# Patient Record
Sex: FEMALE | Race: OTHER | HISPANIC OR LATINO
[De-identification: names, ages, dates, MRNs, and addresses within clinical notes are randomized per-mention and may not be internally consistent; named-entity substitution may affect disease eponyms.]

---

## 2020-04-18 ENCOUNTER — APPOINTMENT (OUTPATIENT)
Dept: DISASTER EMERGENCY | Facility: CLINIC | Age: 50
End: 2020-04-18
Payer: COMMERCIAL

## 2020-04-18 VITALS
SYSTOLIC BLOOD PRESSURE: 124 MMHG | HEART RATE: 84 BPM | RESPIRATION RATE: 16 BRPM | OXYGEN SATURATION: 97 % | DIASTOLIC BLOOD PRESSURE: 80 MMHG | TEMPERATURE: 98.6 F

## 2020-04-18 VITALS — BODY MASS INDEX: 24.63 KG/M2 | WEIGHT: 139 LBS | HEIGHT: 63 IN

## 2020-04-18 DIAGNOSIS — R68.89 OTHER GENERAL SYMPTOMS AND SIGNS: ICD-10-CM

## 2020-04-18 PROBLEM — Z00.00 ENCOUNTER FOR PREVENTIVE HEALTH EXAMINATION: Status: ACTIVE | Noted: 2020-04-18

## 2020-04-18 PROCEDURE — 99203 OFFICE O/P NEW LOW 30 MIN: CPT

## 2020-04-18 NOTE — HISTORY OF PRESENT ILLNESS
[Patient presents to the office today for COVID-19 evaluation and testing.] : Patient presents to the office today for COVID-19 evaluation and testing. [Patient has been pre-screened by RN at call center for appointment today with our facility.] : Patient has been pre-screened by RN at call center for appointment today with our facility. [] : no dizziness on standing [None Known] : none known [None] : none [Clear] : clear [Speaks in full sentences] : speaks in full sentences [Normal O2 sat at rest] : normal O2 sat at rest [Grossly normal, interacts, not tired or weak] : grossly normal, interacts, not tired or weak [COVID-19 testing ordered and specimen obtained] : COVID-19 testing ordered and specimen obtained [Discharged with current Quarantine instructions and advised of signs of worsening illness.] : Patient discharged with current quarantine instructions and advised of signs of worsening illness. Patient told to seek emergent care if symptoms occur. [FreeTextEntry1] : Laurel is a 49 year old female who presents for COVID 19 testing. She reports chills, night sweats,  fatigue, SOB, dry cough, muscle aches, and diarrhea x 2 days. She lives with her mother, , and disabled 20 year old son. She works as a  but has not worked in the last month. She denies sick contacts.  Past medical history of malignant breast cancer 14 years ago.  [[id number]] : \par Patient education provided for COVID-19. Explained if symptoms such as SOB or fever progress, patient instructed to go to ED.  Discussed isolation precautions and handwashing techniques. Social distancing reviewed. Utilize Tylenol for fever >101. No signs of cardiovascular decompensation. Patient verbalized understanding of provided instructions. Tests results may take up to 3-7 days to result. Discussed possibility of false negative results. Instructed to self quarantine and must remain quarantined x 14 days and no fever for three days without antipyretic medication.  All patient close contacts should also self quarantine. Social distancing once quarantine is completed. If patient has symptoms of chest discomfort, severe SOB at rest, worsening shortness of breath with minimal exertion, new or worsening wheezing, dizziness were instructed to seek immediate medical evaluation. \par \par COVID-19 nasal swab preformed and ordered in office today. Advised test results may take 3-7 days to return. Discussed diagnostic accuracy and possibility of false negative results. Instructed to self quarantine and must remain quarantined x 14 days and no fever for 3 days without antifever medications.  All patients close contacts should also self quarantine.  Close contacts with comorbidities at higher risk of COVID disease.  Social distancing once quarantine is completed.  If high risk symptoms of chest discomfort, severe shortness of breath at rest, worsening shortness of breath with minimal exertion, new or worsening wheezing, dizziness then instructed to seek immediate medical evaluation.  A letter for work and patient education form was provided.  The above plan was reviewed with the patient.  All questions have been answered.  Instructed to call if any new symptoms\par  \par \par \par

## 2020-04-19 LAB — SARS-COV-2 N GENE NPH QL NAA+PROBE: NOT DETECTED

## 2021-05-17 ENCOUNTER — NON-APPOINTMENT (OUTPATIENT)
Age: 51
End: 2021-05-17

## 2021-05-20 ENCOUNTER — NON-APPOINTMENT (OUTPATIENT)
Age: 51
End: 2021-05-20

## 2021-05-21 ENCOUNTER — NON-APPOINTMENT (OUTPATIENT)
Age: 51
End: 2021-05-21

## 2021-05-24 ENCOUNTER — APPOINTMENT (OUTPATIENT)
Dept: COLORECTAL SURGERY | Facility: CLINIC | Age: 51
End: 2021-05-24
Payer: COMMERCIAL

## 2021-05-24 VITALS
TEMPERATURE: 97.4 F | BODY MASS INDEX: 26.58 KG/M2 | HEIGHT: 63 IN | HEART RATE: 70 BPM | WEIGHT: 150 LBS | SYSTOLIC BLOOD PRESSURE: 110 MMHG | DIASTOLIC BLOOD PRESSURE: 71 MMHG

## 2021-05-24 DIAGNOSIS — Z85.3 PERSONAL HISTORY OF MALIGNANT NEOPLASM OF BREAST: ICD-10-CM

## 2021-05-24 PROCEDURE — 99072 ADDL SUPL MATRL&STAF TM PHE: CPT

## 2021-05-24 PROCEDURE — 99204 OFFICE O/P NEW MOD 45 MIN: CPT

## 2021-05-24 RX ORDER — AMOXICILLIN 500 MG/1
CAPSULE ORAL
Refills: 0 | Status: DISCONTINUED | COMMUNITY

## 2021-05-24 NOTE — HISTORY OF PRESENT ILLNESS
[FreeTextEntry1] : 51 yo F with recent admission to [facility] for perforated diverticulitis 5/12/21.\par \par Patient presented to hospital with abdominal pain; CT scan showed diverticulitis in proximal sigmoid associated with a few droplets of air. She was managed nonoperatively with antibiotics and discharged on 5/17/21 with Augmentin and on a liquid diet. Patient has been taking Augmentin, will take final doses tomorrow. Still on liquid or pureed diet, avoiding fiber. Tried fish but had nausea and cramping pain.\par \par BH: daily; but since this episode, now small and firmer bowel movements.\par \par Never had a colonoscopy. No family history of colorectal cancer or inflammatory bowel disease; mother with diverticulitis.\par \par No ASA or NSAIDs, tylenol as needed for pain but rarely.\par \par Had a surgery to remove gallstones when she was 16 yo; has her gallbladder. Unsure what procedure was done, surgery performed in [facility]. Has upper midline scar.

## 2021-05-24 NOTE — ASSESSMENT
[FreeTextEntry1] : Complicated diverticulitis with extraluminal air.\par \par Recommend additional course of antibiotic therapy for persistent discomfort. Advised patient overall of elective surgery versus nonoperative conservative management. I reviewed with the patient the risks, benefits alternatives of surgery versus nonoperative treatment. The risk of recurrent diverticulitis and secondary complications was outlined. The patient wishes to proceed with surgical treatment.\par \par She will complete a course of additional antibiotics. She will return in 4 weeks for repeat assessment and planning for surgical resection. Advised patient need of need for preoperative colonoscopy for surveillance of potential online cortical polyps and cancer. Anticipate surgery in 2 months

## 2021-05-24 NOTE — PHYSICAL EXAM
[Abdomen Masses] : No abdominal masses [Abdomen Tenderness] : ~T No ~M abdominal tenderness [No HSM] : no hepatosplenomegaly [JVD] : no jugular venous distention  [Normal Breath Sounds] : Normal breath sounds [Normal Heart Sounds] : normal heart sounds [de-identified] : Mild/moderate left lower quadrant tenderness. No rebound or guarding

## 2021-06-28 ENCOUNTER — APPOINTMENT (OUTPATIENT)
Dept: COLORECTAL SURGERY | Facility: CLINIC | Age: 51
End: 2021-06-28
Payer: COMMERCIAL

## 2021-06-28 VITALS
TEMPERATURE: 98.2 F | DIASTOLIC BLOOD PRESSURE: 76 MMHG | WEIGHT: 151 LBS | BODY MASS INDEX: 26.75 KG/M2 | SYSTOLIC BLOOD PRESSURE: 113 MMHG | HEART RATE: 76 BPM | HEIGHT: 63 IN

## 2021-06-28 DIAGNOSIS — Z83.3 FAMILY HISTORY OF DIABETES MELLITUS: ICD-10-CM

## 2021-06-28 DIAGNOSIS — Z80.3 FAMILY HISTORY OF MALIGNANT NEOPLASM OF BREAST: ICD-10-CM

## 2021-06-28 DIAGNOSIS — Z84.89 FAMILY HISTORY OF OTHER SPECIFIED CONDITIONS: ICD-10-CM

## 2021-06-28 DIAGNOSIS — Z80.1 FAMILY HISTORY OF MALIGNANT NEOPLASM OF TRACHEA, BRONCHUS AND LUNG: ICD-10-CM

## 2021-06-28 DIAGNOSIS — Z80.6 FAMILY HISTORY OF LEUKEMIA: ICD-10-CM

## 2021-06-28 DIAGNOSIS — Z83.511 FAMILY HISTORY OF GLAUCOMA: ICD-10-CM

## 2021-06-28 DIAGNOSIS — Z72.89 OTHER PROBLEMS RELATED TO LIFESTYLE: ICD-10-CM

## 2021-06-28 DIAGNOSIS — Z87.891 PERSONAL HISTORY OF NICOTINE DEPENDENCE: ICD-10-CM

## 2021-06-28 PROCEDURE — 99072 ADDL SUPL MATRL&STAF TM PHE: CPT

## 2021-06-28 PROCEDURE — 99215 OFFICE O/P EST HI 40 MIN: CPT

## 2021-06-28 RX ORDER — AMOXICILLIN AND CLAVULANATE POTASSIUM 875; 125 MG/1; MG/1
875-125 TABLET, COATED ORAL
Qty: 14 | Refills: 0 | Status: DISCONTINUED | COMMUNITY
Start: 2021-05-24 | End: 2021-06-28

## 2021-06-28 NOTE — ASSESSMENT
[FreeTextEntry1] : I had extensive discussion with the patient (45 minutes) regarding the diagnosis and treatment options. I recommended that she consider proceeding with a robotic sigmoid colectomy.\par .\par The associated risks, benefits, alternatives of the procedure have been outlined discussed and reviewed with the patient's family. These risks including but not limited to bleeding, infection, anastomotic leak, need for secondary surgery, need for ileostomy or colostomy creation, change in bowel habits, DVt, PE , as well as the risk of heart and lung complications infection and death were detailed. The patient understands these risks and consents the planned procedure. Appropriate  literature regarding surgery and post operative treatment/complications and enhanced recovery pathway has been detailed and reviewed. Consent was obtained. All questions were answered.\par

## 2021-06-28 NOTE — HISTORY OF PRESENT ILLNESS
[FreeTextEntry1] : 50 y/o F presents for f/u diverticulitis \par PSH of cholecystectomy \par \par Patient hospitalized at [facility] 5/12/21 for perforated diverticulitis, d/c'd home on 5/17/21 with a course of Augmentin\par \par Last seen in the office on 5/24/21. Mild to moderate left lower quadrant tenderness noted on exam, given an additional 7 day course of Augmentin. Completed with improvement in symptoms\par \par C/o GERD. Also c/o abdominal pain and bloating 10-15 minutes after eating, especially when consuming fiber. Currently following a low fiber bland diet\par Reports one episode of vomiting several days ago associated with reflux\par C/o difficulty passing gas\par Denies fever or chills\par BH: Daily\par Admits to straining\par No use of stool softeners, fiber supplements or laxatives \par Never had a colonoscopy

## 2021-07-08 ENCOUNTER — NON-APPOINTMENT (OUTPATIENT)
Age: 51
End: 2021-07-08

## 2021-07-08 ENCOUNTER — APPOINTMENT (OUTPATIENT)
Dept: GASTROENTEROLOGY | Facility: CLINIC | Age: 51
End: 2021-07-08
Payer: COMMERCIAL

## 2021-07-08 VITALS
SYSTOLIC BLOOD PRESSURE: 102 MMHG | BODY MASS INDEX: 26.58 KG/M2 | WEIGHT: 150 LBS | DIASTOLIC BLOOD PRESSURE: 62 MMHG | HEIGHT: 63 IN | HEART RATE: 72 BPM | TEMPERATURE: 98.2 F

## 2021-07-08 DIAGNOSIS — K57.80 DIVERTICULITIS OF INTESTINE, PART UNSPECIFIED, WITH PERFORATION AND ABSCESS W/OUT BLEEDING: ICD-10-CM

## 2021-07-08 PROCEDURE — 99203 OFFICE O/P NEW LOW 30 MIN: CPT

## 2021-07-08 PROCEDURE — 99072 ADDL SUPL MATRL&STAF TM PHE: CPT

## 2021-07-08 RX ORDER — SIMETHICONE 125 MG
125 CAPSULE ORAL
Refills: 0 | Status: ACTIVE | COMMUNITY

## 2021-07-08 NOTE — ASSESSMENT
[FreeTextEntry1] : recent acute diverticulitis with micro perforation of the sigmoid colon\par \par 2.  patient is scheduled to have surgery with Dr Ben Gutierrez, [place] rectal surgeon at [facility], and patient is scheduled for approx july 28th\par \par 3.  to perform colonoscopy and egd next week, and it will be approx eight weeks since the diverticulitis onset\par \par 4.  will give a dose of augmentin 875 mg two hours before; although this is not usually my practise, i am a bit concerned by the microperforation history\par \par 5\par \par \par More than 50% of the face to face time was devoted to counseling and /or coordination of care.  THis coordination of care may have included reviewing other medical notes and reports, and communicating with other health professionals\par AS WE OBTAIN INFORMED CONSENT FOR COLONOSCOPY, UPPER ENDOSCOPY [EGD], OR BOTH PROCEDURES TOGETHER;\par \par As with all procedures, there are risks of which the patient should be aware\par \par 1.  Anesthesia; deep sedation with Propofol;  there is a small risk of aspiration and pulmonary infection.  The Anesthesiologist meets with the patient the morning of the procedure to discuss in more detail\par \par 2.  risk of bleeding; from removal of a polyp, or rarely, from biopsies, 1 % or less\par \par 3.  risk of injury or perforation of the colon or upper GI tract; one in a thousand or less,  from removing a polyp or from advancing the instrument\par \par \par schedule of augmentin\par augmentin 875 mg po at 8 am on july 14th, then 8pm that night, then two doses the next day\par \par will order ten days of augmentin just in case it is needed

## 2021-07-08 NOTE — HISTORY OF PRESENT ILLNESS
[FreeTextEntry1] : patient in mid May 2021 for acute diverticulitis with several air droplets contained around the sigmoid colon consistent with a microperforation of acutediverticulitis. She had peritoneal findings in the left lower quadrant, was seen I Dr. Anthony Mcclain  of surgery,and received Zosyn and then followed the course with Augmentin at home.\par \par The patient had a questionable history of penicillin allergy, but tolerated the treatment with zosyn and augmentin well without any reaction at all\par \par Patient has been more or less assymptomatic since discharge, but does experience some milld llq discomfort, transient, at times\par \par this was her first attack of diverticulitis\par \par She has not had any significant medical issues, and just takes allegra\par \par she has been experiencing frequent heartburn to virtually all foods recently, but has not been on any regular   therapy.\par no cardiovascular disease\par \par has not previously had a colonoscopy, and there is no fh of colon cancer

## 2021-07-13 ENCOUNTER — RESULT REVIEW (OUTPATIENT)
Age: 51
End: 2021-07-13

## 2021-07-14 ENCOUNTER — RESULT REVIEW (OUTPATIENT)
Age: 51
End: 2021-07-14

## 2021-07-14 ENCOUNTER — APPOINTMENT (OUTPATIENT)
Dept: GASTROENTEROLOGY | Facility: HOSPITAL | Age: 51
End: 2021-07-14

## 2021-07-27 ENCOUNTER — TRANSCRIPTION ENCOUNTER (OUTPATIENT)
Age: 51
End: 2021-07-27

## 2021-07-27 VITALS
OXYGEN SATURATION: 98 % | DIASTOLIC BLOOD PRESSURE: 76 MMHG | HEART RATE: 73 BPM | HEIGHT: 63 IN | WEIGHT: 151.9 LBS | SYSTOLIC BLOOD PRESSURE: 116 MMHG | RESPIRATION RATE: 18 BRPM | TEMPERATURE: 98 F

## 2021-07-28 ENCOUNTER — INPATIENT (INPATIENT)
Facility: HOSPITAL | Age: 51
LOS: 1 days | Discharge: HOME | DRG: 331 | End: 2021-07-30
Attending: SURGERY | Admitting: SURGERY
Payer: COMMERCIAL

## 2021-07-28 ENCOUNTER — APPOINTMENT (OUTPATIENT)
Dept: COLORECTAL SURGERY | Facility: HOSPITAL | Age: 51
End: 2021-07-28

## 2021-07-28 ENCOUNTER — RESULT REVIEW (OUTPATIENT)
Age: 51
End: 2021-07-28

## 2021-07-28 DIAGNOSIS — Z98.891 HISTORY OF UTERINE SCAR FROM PREVIOUS SURGERY: Chronic | ICD-10-CM

## 2021-07-28 DIAGNOSIS — Z90.49 ACQUIRED ABSENCE OF OTHER SPECIFIED PARTS OF DIGESTIVE TRACT: Chronic | ICD-10-CM

## 2021-07-28 LAB
BLD GP AB SCN SERPL QL: NEGATIVE — SIGNIFICANT CHANGE UP
RH IG SCN BLD-IMP: NEGATIVE — SIGNIFICANT CHANGE UP

## 2021-07-28 PROCEDURE — 45330 DIAGNOSTIC SIGMOIDOSCOPY: CPT | Mod: GC

## 2021-07-28 PROCEDURE — 88307 TISSUE EXAM BY PATHOLOGIST: CPT | Mod: 26

## 2021-07-28 PROCEDURE — 44213 LAP MOBIL SPLENIC FL ADD-ON: CPT | Mod: GC

## 2021-07-28 PROCEDURE — 88304 TISSUE EXAM BY PATHOLOGIST: CPT | Mod: 26

## 2021-07-28 PROCEDURE — 44207 L COLECTOMY/COLOPROCTOSTOMY: CPT | Mod: GC

## 2021-07-28 RX ORDER — ONDANSETRON 8 MG/1
4 TABLET, FILM COATED ORAL EVERY 6 HOURS
Refills: 0 | Status: DISCONTINUED | OUTPATIENT
Start: 2021-07-28 | End: 2021-07-30

## 2021-07-28 RX ORDER — ACETAMINOPHEN 500 MG
1000 TABLET ORAL ONCE
Refills: 0 | Status: COMPLETED | OUTPATIENT
Start: 2021-07-29 | End: 2021-07-29

## 2021-07-28 RX ORDER — SODIUM CHLORIDE 9 MG/ML
1000 INJECTION, SOLUTION INTRAVENOUS
Refills: 0 | Status: DISCONTINUED | OUTPATIENT
Start: 2021-07-28 | End: 2021-07-30

## 2021-07-28 RX ORDER — HYDROMORPHONE HYDROCHLORIDE 2 MG/ML
0.5 INJECTION INTRAMUSCULAR; INTRAVENOUS; SUBCUTANEOUS EVERY 4 HOURS
Refills: 0 | Status: DISCONTINUED | OUTPATIENT
Start: 2021-07-28 | End: 2021-07-30

## 2021-07-28 RX ORDER — HEPARIN SODIUM 5000 [USP'U]/ML
5000 INJECTION INTRAVENOUS; SUBCUTANEOUS ONCE
Refills: 0 | Status: COMPLETED | OUTPATIENT
Start: 2021-07-28 | End: 2021-07-28

## 2021-07-28 RX ORDER — ACETAMINOPHEN 500 MG
1000 TABLET ORAL EVERY 6 HOURS
Refills: 0 | Status: DISCONTINUED | OUTPATIENT
Start: 2021-07-28 | End: 2021-07-28

## 2021-07-28 RX ORDER — HEPARIN SODIUM 5000 [USP'U]/ML
5000 INJECTION INTRAVENOUS; SUBCUTANEOUS EVERY 8 HOURS
Refills: 0 | Status: DISCONTINUED | OUTPATIENT
Start: 2021-07-28 | End: 2021-07-30

## 2021-07-28 RX ORDER — GABAPENTIN 400 MG/1
600 CAPSULE ORAL ONCE
Refills: 0 | Status: COMPLETED | OUTPATIENT
Start: 2021-07-28 | End: 2021-07-28

## 2021-07-28 RX ORDER — KETOROLAC TROMETHAMINE 30 MG/ML
15 SYRINGE (ML) INJECTION EVERY 6 HOURS
Refills: 0 | Status: DISCONTINUED | OUTPATIENT
Start: 2021-07-28 | End: 2021-07-30

## 2021-07-28 RX ORDER — BUPIVACAINE 13.3 MG/ML
20 INJECTION, SUSPENSION, LIPOSOMAL INFILTRATION ONCE
Refills: 0 | Status: DISCONTINUED | OUTPATIENT
Start: 2021-07-28 | End: 2021-07-28

## 2021-07-28 RX ORDER — FEXOFENADINE HCL 30 MG
1 TABLET ORAL
Qty: 0 | Refills: 0 | DISCHARGE

## 2021-07-28 RX ORDER — ACETAMINOPHEN 500 MG
1000 TABLET ORAL ONCE
Refills: 0 | Status: COMPLETED | OUTPATIENT
Start: 2021-07-28 | End: 2021-07-28

## 2021-07-28 RX ADMIN — Medication 1000 MILLIGRAM(S): at 14:34

## 2021-07-28 RX ADMIN — Medication 15 MILLIGRAM(S): at 23:17

## 2021-07-28 RX ADMIN — Medication 15 MILLIGRAM(S): at 18:25

## 2021-07-28 RX ADMIN — HEPARIN SODIUM 5000 UNIT(S): 5000 INJECTION INTRAVENOUS; SUBCUTANEOUS at 07:41

## 2021-07-28 RX ADMIN — Medication 1000 MILLIGRAM(S): at 13:09

## 2021-07-28 RX ADMIN — GABAPENTIN 600 MILLIGRAM(S): 400 CAPSULE ORAL at 07:41

## 2021-07-28 RX ADMIN — HEPARIN SODIUM 5000 UNIT(S): 5000 INJECTION INTRAVENOUS; SUBCUTANEOUS at 18:25

## 2021-07-28 RX ADMIN — Medication 1000 MILLIGRAM(S): at 07:41

## 2021-07-28 RX ADMIN — Medication 1000 MILLIGRAM(S): at 18:26

## 2021-07-28 RX ADMIN — Medication 1000 MILLIGRAM(S): at 07:48

## 2021-07-28 RX ADMIN — Medication 15 MILLIGRAM(S): at 23:32

## 2021-07-28 RX ADMIN — HYDROMORPHONE HYDROCHLORIDE 0.5 MILLIGRAM(S): 2 INJECTION INTRAMUSCULAR; INTRAVENOUS; SUBCUTANEOUS at 11:56

## 2021-07-28 RX ADMIN — HYDROMORPHONE HYDROCHLORIDE 0.5 MILLIGRAM(S): 2 INJECTION INTRAMUSCULAR; INTRAVENOUS; SUBCUTANEOUS at 12:21

## 2021-07-28 NOTE — BRIEF OPERATIVE NOTE - OPERATION/FINDINGS
Veress needle access. Optiview placement of 12mm port; rest of ports placed under direct visualization. TAP block. Intraoperative colonoscopy demonstrated no transverse or ascending colonic lesions. Right colon and hepatic flexure mobilized. High ligation of ileocolic and R branch of middle colic with Vessel sealer. Proximal and distal margins taken with robotic stapler blue load. Ileocolic anastomosis created with robotic stapler blue load x 1 fire. Common channel closed with Quill running suture and Lemberted. Hemostatic. 5 cm off midline incision created in RLQ and specimen removed. Specimen open and polyp confirmed in distal ascending colon. Closing tray utilized; gowns and gloves changed. Peritoneum closed with 2-0 Vicryl running; fascia closed with #1 PDS running. Closed skin with 3-0 Vicryl deep dermal and 4-0 Monocryl.

## 2021-07-28 NOTE — H&P ADULT - NSHPPHYSICALEXAM_GEN_ALL_CORE
Constitutional: WDWN NAD  Heart: s1 s2  Lungs: no use of accessory muscles  Abdomen: soft, nontender, nondistended, without masses, erythema, ecchymosis

## 2021-07-28 NOTE — H&P ADULT - ASSESSMENT
51 year old female with PMH of multiple bouts of diverticulitis presents for sigmoid colon resection    Plan:  To OR  ERAS protocol  Colon bundle  admission post op

## 2021-07-28 NOTE — H&P ADULT - HISTORY OF PRESENT ILLNESS
51 year old female with PMH of multiple bouts of diverticulitis presents for sigmoid colon resection. Patient last hospitalized at [facility] 5/12/21 for perforated diverticulitis; patient was discharged home 5 days later with a course of Augmentin. Currently patient has no complaints.

## 2021-07-28 NOTE — BRIEF OPERATIVE NOTE - NSICDXBRIEFPROCEDURE_GEN_ALL_CORE_FT
PROCEDURES:  Robot-assisted laparoscopic left colectomy or sigmoidectomy using da Jluis Xi 28-Jul-2021 12:22:32  Yane Go

## 2021-07-28 NOTE — PACU DISCHARGE NOTE - COMMENTS
Pt A&ox4, operative site clean, dry and intact with dermabond; pt reports pain controlled at present, tolerating clears. Plan of care endorsed to receiving rn Frantz, 8306.1

## 2021-07-29 LAB
ANION GAP SERPL CALC-SCNC: 11 MMOL/L — SIGNIFICANT CHANGE UP (ref 5–17)
BUN SERPL-MCNC: 10 MG/DL — SIGNIFICANT CHANGE UP (ref 7–23)
CALCIUM SERPL-MCNC: 9.7 MG/DL — SIGNIFICANT CHANGE UP (ref 8.4–10.5)
CHLORIDE SERPL-SCNC: 109 MMOL/L — HIGH (ref 96–108)
CO2 SERPL-SCNC: 23 MMOL/L — SIGNIFICANT CHANGE UP (ref 22–31)
COVID-19 SPIKE DOMAIN AB INTERP: POSITIVE
COVID-19 SPIKE DOMAIN ANTIBODY RESULT: >250 U/ML — HIGH
CREAT SERPL-MCNC: 0.76 MG/DL — SIGNIFICANT CHANGE UP (ref 0.5–1.3)
GLUCOSE SERPL-MCNC: 101 MG/DL — HIGH (ref 70–99)
HCT VFR BLD CALC: 35.1 % — SIGNIFICANT CHANGE UP (ref 34.5–45)
HGB BLD-MCNC: 11.5 G/DL — SIGNIFICANT CHANGE UP (ref 11.5–15.5)
MAGNESIUM SERPL-MCNC: 2 MG/DL — SIGNIFICANT CHANGE UP (ref 1.6–2.6)
MCHC RBC-ENTMCNC: 29.9 PG — SIGNIFICANT CHANGE UP (ref 27–34)
MCHC RBC-ENTMCNC: 32.8 GM/DL — SIGNIFICANT CHANGE UP (ref 32–36)
MCV RBC AUTO: 91.4 FL — SIGNIFICANT CHANGE UP (ref 80–100)
NRBC # BLD: 0 /100 WBCS — SIGNIFICANT CHANGE UP (ref 0–0)
PHOSPHATE SERPL-MCNC: 3 MG/DL — SIGNIFICANT CHANGE UP (ref 2.5–4.5)
PLATELET # BLD AUTO: 223 K/UL — SIGNIFICANT CHANGE UP (ref 150–400)
POTASSIUM SERPL-MCNC: 3.9 MMOL/L — SIGNIFICANT CHANGE UP (ref 3.5–5.3)
POTASSIUM SERPL-SCNC: 3.9 MMOL/L — SIGNIFICANT CHANGE UP (ref 3.5–5.3)
RBC # BLD: 3.84 M/UL — SIGNIFICANT CHANGE UP (ref 3.8–5.2)
RBC # FLD: 12.5 % — SIGNIFICANT CHANGE UP (ref 10.3–14.5)
SARS-COV-2 IGG+IGM SERPL QL IA: >250 U/ML — HIGH
SARS-COV-2 IGG+IGM SERPL QL IA: POSITIVE
SODIUM SERPL-SCNC: 143 MMOL/L — SIGNIFICANT CHANGE UP (ref 135–145)
WBC # BLD: 14.84 K/UL — HIGH (ref 3.8–10.5)
WBC # FLD AUTO: 14.84 K/UL — HIGH (ref 3.8–10.5)

## 2021-07-29 RX ADMIN — HYDROMORPHONE HYDROCHLORIDE 0.5 MILLIGRAM(S): 2 INJECTION INTRAMUSCULAR; INTRAVENOUS; SUBCUTANEOUS at 17:52

## 2021-07-29 RX ADMIN — Medication 15 MILLIGRAM(S): at 17:48

## 2021-07-29 RX ADMIN — HEPARIN SODIUM 5000 UNIT(S): 5000 INJECTION INTRAVENOUS; SUBCUTANEOUS at 11:30

## 2021-07-29 RX ADMIN — Medication 15 MILLIGRAM(S): at 11:33

## 2021-07-29 RX ADMIN — Medication 400 MILLIGRAM(S): at 00:37

## 2021-07-29 RX ADMIN — HEPARIN SODIUM 5000 UNIT(S): 5000 INJECTION INTRAVENOUS; SUBCUTANEOUS at 02:58

## 2021-07-29 RX ADMIN — Medication 1000 MILLIGRAM(S): at 00:52

## 2021-07-29 RX ADMIN — HYDROMORPHONE HYDROCHLORIDE 0.5 MILLIGRAM(S): 2 INJECTION INTRAMUSCULAR; INTRAVENOUS; SUBCUTANEOUS at 07:40

## 2021-07-29 RX ADMIN — Medication 400 MILLIGRAM(S): at 06:39

## 2021-07-29 RX ADMIN — HYDROMORPHONE HYDROCHLORIDE 0.5 MILLIGRAM(S): 2 INJECTION INTRAMUSCULAR; INTRAVENOUS; SUBCUTANEOUS at 17:48

## 2021-07-29 RX ADMIN — Medication 15 MILLIGRAM(S): at 23:15

## 2021-07-29 RX ADMIN — HYDROMORPHONE HYDROCHLORIDE 0.5 MILLIGRAM(S): 2 INJECTION INTRAMUSCULAR; INTRAVENOUS; SUBCUTANEOUS at 11:34

## 2021-07-29 RX ADMIN — HYDROMORPHONE HYDROCHLORIDE 0.5 MILLIGRAM(S): 2 INJECTION INTRAMUSCULAR; INTRAVENOUS; SUBCUTANEOUS at 11:30

## 2021-07-29 RX ADMIN — Medication 1000 MILLIGRAM(S): at 06:54

## 2021-07-29 RX ADMIN — Medication 15 MILLIGRAM(S): at 06:18

## 2021-07-29 RX ADMIN — Medication 15 MILLIGRAM(S): at 23:30

## 2021-07-29 RX ADMIN — HYDROMORPHONE HYDROCHLORIDE 0.5 MILLIGRAM(S): 2 INJECTION INTRAMUSCULAR; INTRAVENOUS; SUBCUTANEOUS at 07:25

## 2021-07-29 RX ADMIN — Medication 15 MILLIGRAM(S): at 06:03

## 2021-07-29 RX ADMIN — Medication 15 MILLIGRAM(S): at 11:30

## 2021-07-29 RX ADMIN — HEPARIN SODIUM 5000 UNIT(S): 5000 INJECTION INTRAVENOUS; SUBCUTANEOUS at 17:48

## 2021-07-29 RX ADMIN — Medication 15 MILLIGRAM(S): at 17:52

## 2021-07-29 NOTE — PROGRESS NOTE ADULT - ASSESSMENT
51 year old female with PMH of multiple bouts of diverticulitis presents for sigmoid colon resection. Patient last hospitalized at [facility] 5/12/21 for perforated diverticulitis; patient was discharged home 5 days later with a course of Augmentin. Presented for elective surgery, now s/p robot assisted sigmoidectomy on 7/28.    Pain/nausea control prn  CLD/IVF  HSQ/SCDs  D/c Weaver   AM labs

## 2021-07-30 ENCOUNTER — TRANSCRIPTION ENCOUNTER (OUTPATIENT)
Age: 51
End: 2021-07-30

## 2021-07-30 VITALS
HEART RATE: 87 BPM | OXYGEN SATURATION: 95 % | SYSTOLIC BLOOD PRESSURE: 144 MMHG | RESPIRATION RATE: 16 BRPM | TEMPERATURE: 99 F | DIASTOLIC BLOOD PRESSURE: 80 MMHG

## 2021-07-30 LAB
ANION GAP SERPL CALC-SCNC: 4 MMOL/L — LOW (ref 5–17)
BUN SERPL-MCNC: 7 MG/DL — SIGNIFICANT CHANGE UP (ref 7–23)
CALCIUM SERPL-MCNC: 9.2 MG/DL — SIGNIFICANT CHANGE UP (ref 8.4–10.5)
CHLORIDE SERPL-SCNC: 108 MMOL/L — SIGNIFICANT CHANGE UP (ref 96–108)
CO2 SERPL-SCNC: 29 MMOL/L — SIGNIFICANT CHANGE UP (ref 22–31)
CREAT SERPL-MCNC: 0.73 MG/DL — SIGNIFICANT CHANGE UP (ref 0.5–1.3)
GLUCOSE SERPL-MCNC: 94 MG/DL — SIGNIFICANT CHANGE UP (ref 70–99)
HCT VFR BLD CALC: 31.2 % — LOW (ref 34.5–45)
HGB BLD-MCNC: 9.9 G/DL — LOW (ref 11.5–15.5)
MAGNESIUM SERPL-MCNC: 1.6 MG/DL — SIGNIFICANT CHANGE UP (ref 1.6–2.6)
MCHC RBC-ENTMCNC: 29.7 PG — SIGNIFICANT CHANGE UP (ref 27–34)
MCHC RBC-ENTMCNC: 31.7 GM/DL — LOW (ref 32–36)
MCV RBC AUTO: 93.7 FL — SIGNIFICANT CHANGE UP (ref 80–100)
NRBC # BLD: 0 /100 WBCS — SIGNIFICANT CHANGE UP (ref 0–0)
PHOSPHATE SERPL-MCNC: 3.1 MG/DL — SIGNIFICANT CHANGE UP (ref 2.5–4.5)
PLATELET # BLD AUTO: 176 K/UL — SIGNIFICANT CHANGE UP (ref 150–400)
POTASSIUM SERPL-MCNC: 4 MMOL/L — SIGNIFICANT CHANGE UP (ref 3.5–5.3)
POTASSIUM SERPL-SCNC: 4 MMOL/L — SIGNIFICANT CHANGE UP (ref 3.5–5.3)
RBC # BLD: 3.33 M/UL — LOW (ref 3.8–5.2)
RBC # FLD: 12.8 % — SIGNIFICANT CHANGE UP (ref 10.3–14.5)
SODIUM SERPL-SCNC: 141 MMOL/L — SIGNIFICANT CHANGE UP (ref 135–145)
WBC # BLD: 6.98 K/UL — SIGNIFICANT CHANGE UP (ref 3.8–10.5)
WBC # FLD AUTO: 6.98 K/UL — SIGNIFICANT CHANGE UP (ref 3.8–10.5)

## 2021-07-30 PROCEDURE — 84100 ASSAY OF PHOSPHORUS: CPT

## 2021-07-30 PROCEDURE — 82962 GLUCOSE BLOOD TEST: CPT

## 2021-07-30 PROCEDURE — 36415 COLL VENOUS BLD VENIPUNCTURE: CPT

## 2021-07-30 PROCEDURE — 86850 RBC ANTIBODY SCREEN: CPT

## 2021-07-30 PROCEDURE — C1889: CPT

## 2021-07-30 PROCEDURE — 83735 ASSAY OF MAGNESIUM: CPT

## 2021-07-30 PROCEDURE — 88304 TISSUE EXAM BY PATHOLOGIST: CPT

## 2021-07-30 PROCEDURE — 86900 BLOOD TYPING SEROLOGIC ABO: CPT

## 2021-07-30 PROCEDURE — 85027 COMPLETE CBC AUTOMATED: CPT

## 2021-07-30 PROCEDURE — S2900: CPT

## 2021-07-30 PROCEDURE — 86769 SARS-COV-2 COVID-19 ANTIBODY: CPT

## 2021-07-30 PROCEDURE — 88307 TISSUE EXAM BY PATHOLOGIST: CPT

## 2021-07-30 PROCEDURE — 80048 BASIC METABOLIC PNL TOTAL CA: CPT

## 2021-07-30 PROCEDURE — 86901 BLOOD TYPING SEROLOGIC RH(D): CPT

## 2021-07-30 RX ORDER — OXYCODONE AND ACETAMINOPHEN 5; 325 MG/1; MG/1
1 TABLET ORAL
Qty: 20 | Refills: 0
Start: 2021-07-30 | End: 2021-08-03

## 2021-07-30 RX ORDER — DOCUSATE SODIUM 100 MG
1 CAPSULE ORAL
Qty: 60 | Refills: 0
Start: 2021-07-30 | End: 2021-08-28

## 2021-07-30 RX ORDER — MAGNESIUM SULFATE 500 MG/ML
2 VIAL (ML) INJECTION EVERY 6 HOURS
Refills: 0 | Status: DISCONTINUED | OUTPATIENT
Start: 2021-07-30 | End: 2021-07-30

## 2021-07-30 RX ADMIN — HYDROMORPHONE HYDROCHLORIDE 0.5 MILLIGRAM(S): 2 INJECTION INTRAMUSCULAR; INTRAVENOUS; SUBCUTANEOUS at 02:22

## 2021-07-30 RX ADMIN — Medication 15 MILLIGRAM(S): at 07:12

## 2021-07-30 RX ADMIN — Medication 50 GRAM(S): at 12:05

## 2021-07-30 RX ADMIN — Medication 15 MILLIGRAM(S): at 12:20

## 2021-07-30 RX ADMIN — Medication 15 MILLIGRAM(S): at 12:05

## 2021-07-30 RX ADMIN — HYDROMORPHONE HYDROCHLORIDE 0.5 MILLIGRAM(S): 2 INJECTION INTRAMUSCULAR; INTRAVENOUS; SUBCUTANEOUS at 02:37

## 2021-07-30 RX ADMIN — Medication 15 MILLIGRAM(S): at 06:57

## 2021-07-30 RX ADMIN — HEPARIN SODIUM 5000 UNIT(S): 5000 INJECTION INTRAVENOUS; SUBCUTANEOUS at 12:06

## 2021-07-30 RX ADMIN — HEPARIN SODIUM 5000 UNIT(S): 5000 INJECTION INTRAVENOUS; SUBCUTANEOUS at 02:22

## 2021-07-30 NOTE — DISCHARGE NOTE PROVIDER - INSTRUCTIONS
Vegetables should be initially be cooked (baked, steamed, blanched, etc)  May want to start with peeled and/or canned fruit  Limit fat/oil intake and fried/greasy foods  Add small amounts of fiber back in to the diet every couple diet

## 2021-07-30 NOTE — DISCHARGE NOTE PROVIDER - CARE PROVIDER_API CALL
Ben Gutierrez)  ColonRectal Surgery; Surgery  [facility], 2nd Floor  [place], [place] [address]  Phone: [phone]  Fax: [phone]  Follow Up Time:

## 2021-07-30 NOTE — PROGRESS NOTE ADULT - ASSESSMENT
51 year old female with PMH of multiple bouts of diverticulitis presents for sigmoid colon resection. Patient last hospitalized at [facility] 5/12/21 for perforated diverticulitis; patient was discharged home 5 days later with a course of Augmentin. Presented for elective surgery, now s/p robot assisted sigmoidectomy on 7/28.    Pain/nausea control prn  Adv to LFD  HSQ/SCDs  AM labs   Possible d/c home later if tolerating diet/HD stable

## 2021-07-30 NOTE — DISCHARGE NOTE PROVIDER - NSDCMRMEDTOKEN_GEN_ALL_CORE_FT
Allegra 60 mg oral tablet: 1 tab(s) orally once a day  Colace 100 mg oral capsule: 1 cap(s) orally 2 times a day MDD:2 caps  Percocet 5 mg-325 mg oral tablet: 1 tab(s) orally every 6 hours MDD:4 tabs

## 2021-07-30 NOTE — DISCHARGE NOTE PROVIDER - HOSPITAL COURSE
51 year old female with PMH of multiple bouts of diverticulitis presents for sigmoid colon resection. Patient last hospitalized at [facility] 5/12/21 for perforated diverticulitis; patient was discharged home 5 days later with a course of Augmentin. Pt admitted for elective robot assisted sigmoidectomy of 7/28. There were no operative complications. Post operatively, pt was stable with no abnormal findings. Pain was adequately controlled, pt made good urine and was encouraged to ambulate. On 7/29 pt's castro was removed and she passed her trial of void. She began passing gas and stool and had no nausea or vomiting. Pt continued to meet postoperative milestones. On 7/30 she was advanced to a LRD, which she tolerated. At time of discharge pt had met all post-op milestones and was hemodynamically stable.

## 2021-07-30 NOTE — DISCHARGE NOTE PROVIDER - NSDCCPTREATMENT_GEN_ALL_CORE_FT
PRINCIPAL PROCEDURE  Procedure: Robot-assisted laparoscopic left colectomy or sigmoidectomy using da Jluis Xi  Findings and Treatment:

## 2021-07-30 NOTE — DISCHARGE NOTE NURSING/CASE MANAGEMENT/SOCIAL WORK - PATIENT PORTAL LINK FT
You can access the FollowMyHealth Patient Portal offered by [facility] by registering at the following website: [email or web address]. By joining [employer]’s FollowMyHealth portal, you will also be able to view your health information using other applications (apps) compatible with our system.

## 2021-07-30 NOTE — PROGRESS NOTE ADULT - SUBJECTIVE AND OBJECTIVE BOX
Procedure: robotic assisted sigmoidectomy   Surgeon: Brenda    S: Pt has no complaints. Denies CP, SOB, REGALADO, calf tenderness. Pain controlled with medication.    O:  T(C): 36.2 (07-28-21 @ 11:45), Max: 36.2 (07-28-21 @ 11:45)  T(F): 97.2 (07-28-21 @ 11:45), Max: 97.2 (07-28-21 @ 11:45)  HR: 68 (07-28-21 @ 14:00) (68 - 90)  BP: 95/53 (07-28-21 @ 14:00) (95/53 - 109/61)  RR: 19 (07-28-21 @ 12:15) (19 - 22)  SpO2: 94% (07-28-21 @ 14:00) (93% - 100%)  Wt(kg): --            Gen: NAD, resting comfortably in bed  C/V: NSR  Pulm: Nonlabored breathing, no respiratory distress  Abd: soft, NT/ND Incision: CDI  Extrem: WWP, no calf edema, SCDs in place      A/P: 51yFemale s/p Robotic assisted sigmoidectomy   Diet: CLD  IVF: LR@40  Pain/nausea control  DVT ppx: SCDs, SQH  Weaver to gravity 
INTERVAL HPI/OVERNIGHT EVENTS: -n/-v, +f/+ bm x1, pain well controlled     STATUS POST:  7/28: robot assisted sigmoidectomy     POST OPERATIVE DAY #: 2    SUBJECTIVE: Pt seen and examined at bedside this am by surgery team. Tolerating diet, pain well controlled. +F/+BM. Denies f/n/v/cp/sob.    MEDICATIONS  (STANDING):  heparin   Injectable 5000 Unit(s) SubCutaneous every 8 hours  ketorolac   Injectable 15 milliGRAM(s) IV Push every 6 hours  magnesium sulfate  IVPB 2 Gram(s) IV Intermittent every 6 hours    MEDICATIONS  (PRN):  HYDROmorphone  Injectable 0.5 milliGRAM(s) IV Push every 4 hours PRN Severe Pain (7 - 10)  ondansetron Injectable 4 milliGRAM(s) IV Push every 6 hours PRN Nausea and/or Vomiting      Vital Signs Last 24 Hrs  T(C): 36.7 (30 Jul 2021 09:10), Max: 37 (29 Jul 2021 20:55)  T(F): 98 (30 Jul 2021 09:10), Max: 98.6 (29 Jul 2021 20:55)  HR: 70 (30 Jul 2021 09:10) (60 - 82)  BP: 118/70 (30 Jul 2021 09:10) (107/67 - 118/70)  BP(mean): 86 (30 Jul 2021 05:00) (80 - 86)  RR: 17 (30 Jul 2021 09:10) (17 - 17)  SpO2: 97% (30 Jul 2021 09:10) (95% - 97%)    PHYSICAL EXAM:    Constitutional: A&Ox3, NAD    Respiratory: non labored breathing, no respiratory distress    Cardiovascular: NSR, RRR    Gastrointestinal:  abdomen soft, nd, appropriately tttp to incisions. Dressings c/d/i.     Extremities: wwp, no calf tenderness or edema. SCDs in place       I&O's Detail    29 Jul 2021 07:01  -  30 Jul 2021 07:00  --------------------------------------------------------  IN:    Lactated Ringers: 400 mL    Oral Fluid: 240 mL  Total IN: 640 mL    OUT:    Voided (mL): 1150 mL  Total OUT: 1150 mL    Total NET: -510 mL          LABS:                        9.9    6.98  )-----------( 176 ( 30 Jul 2021 07:46 )             31.2     07-30    141  |  108  |  7   ----------------------------<  94  4.0   |  29  |  0.73    Ca    9.2      30 Jul 2021 07:46  Phos  3.1     07-30  Mg     1.6     07-30            RADIOLOGY & ADDITIONAL STUDIES:
STATUS POST:    7/28: robot assisted sigmoidectomy     POST OPERATIVE DAY #: 1    SUBJECTIVE: Pt seen and examined at bedside this am by surgery team. She was complaining of pain around her incisions. Denies f/n/v/cp/sob.    MEDICATIONS  (STANDING):  heparin   Injectable 5000 Unit(s) SubCutaneous every 8 hours  ketorolac   Injectable 15 milliGRAM(s) IV Push every 6 hours  lactated ringers. 1000 milliLiter(s) (40 mL/Hr) IV Continuous <Continuous>    MEDICATIONS  (PRN):  HYDROmorphone  Injectable 0.5 milliGRAM(s) IV Push every 4 hours PRN Severe Pain (7 - 10)  ondansetron Injectable 4 milliGRAM(s) IV Push every 6 hours PRN Nausea and/or Vomiting      Vital Signs Last 24 Hrs  T(C): 36.7 (29 Jul 2021 09:02), Max: 37.1 (29 Jul 2021 05:14)  T(F): 98 (29 Jul 2021 09:02), Max: 98.8 (29 Jul 2021 05:14)  HR: 66 (29 Jul 2021 09:02) (66 - 90)  BP: 123/73 (29 Jul 2021 09:02) (95/53 - 123/73)  BP(mean): 86 (29 Jul 2021 07:23) (68 - 86)  RR: 18 (29 Jul 2021 09:02) (15 - 22)  SpO2: 95% (29 Jul 2021 09:02) (93% - 100%)    PHYSICAL EXAM:    Constitutional: Sitting in bed comfortably    Respiratory: non labored breathing, no respiratory distress    Cardiovascular: NSR, RRR    Gastrointestinal: softy, appropriate tenderness around incisions, non-distended, no guarding, incisions c/d/i    Genitourinary: castro    Extremities: (-) edema                  I&O's Detail    28 Jul 2021 07:01  -  29 Jul 2021 07:00  --------------------------------------------------------  IN:    IV PiggyBack: 100 mL    Lactated Ringers: 640 mL    Oral Fluid: 480 mL  Total IN: 1220 mL    OUT:    Indwelling Catheter - Urethral (mL): 2585 mL  Total OUT: 2585 mL    Total NET: -1365 mL          LABS:                        11.5   14.84 )-----------( 223 ( 29 Jul 2021 07:15 )             35.1     07-29    143  |  109<H>  |  10  ----------------------------<  101<H>  3.9   |  23  |  0.76    Ca    9.7      29 Jul 2021 07:15  Phos  3.0     07-29  Mg     2.0     07-29            RADIOLOGY & ADDITIONAL STUDIES:

## 2021-07-30 NOTE — DISCHARGE NOTE PROVIDER - NSDCFUADDINST_GEN_ALL_CORE_FT
Follow up with Dr. Gutierrez in 1-2 weeks. Call the office at the number below to schedule your appointment. You may shower; soap and water over incision sites. Do not scrub. Pat dry when done. No tub bathing or swimming until cleared. Keep incision sites out of the sun as scars will darken. Ambulate as tolerated, but no heavy lifting (>10lbs) or strenuous exercise. You may resume regular diet. You should be urinating at least 3-4x per day. Call the office if you experience increasing abdominal pain, nausea, vomiting, or temperature >101 F.

## 2021-07-30 NOTE — DISCHARGE NOTE PROVIDER - NSDCACTIVITY_GEN_ALL_CORE
Return to Work/School allowed/Sex allowed/Showering allowed/Stairs allowed/Driving allowed/Walking - Indoors allowed/No heavy lifting/straining/Walking - Outdoors allowed

## 2021-08-03 ENCOUNTER — NON-APPOINTMENT (OUTPATIENT)
Age: 51
End: 2021-08-03

## 2021-08-03 LAB — SURGICAL PATHOLOGY STUDY: SIGNIFICANT CHANGE UP

## 2021-08-05 PROBLEM — K57.33 DIVERTICULITIS OF LARGE INTESTINE WITHOUT PERFORATION OR ABSCESS WITH BLEEDING: Chronic | Status: ACTIVE | Noted: 2021-07-27

## 2021-08-06 DIAGNOSIS — K57.32 DIVERTICULITIS OF LARGE INTESTINE WITHOUT PERFORATION OR ABSCESS WITHOUT BLEEDING: ICD-10-CM

## 2021-08-09 ENCOUNTER — APPOINTMENT (OUTPATIENT)
Dept: COLORECTAL SURGERY | Facility: CLINIC | Age: 51
End: 2021-08-09
Payer: COMMERCIAL

## 2021-08-09 VITALS
WEIGHT: 149 LBS | HEART RATE: 73 BPM | BODY MASS INDEX: 26.4 KG/M2 | DIASTOLIC BLOOD PRESSURE: 68 MMHG | SYSTOLIC BLOOD PRESSURE: 103 MMHG | HEIGHT: 63 IN | TEMPERATURE: 97.7 F

## 2021-08-09 DIAGNOSIS — K57.32 DIVERTICULITIS OF LARGE INTESTINE W/OUT PERFORATION OR ABSCESS W/OUT BLEEDING: ICD-10-CM

## 2021-08-09 PROCEDURE — 99024 POSTOP FOLLOW-UP VISIT: CPT

## 2021-08-09 RX ORDER — METRONIDAZOLE 500 MG/1
500 TABLET ORAL
Qty: 6 | Refills: 0 | Status: DISCONTINUED | COMMUNITY
Start: 2021-06-28 | End: 2021-08-09

## 2021-08-09 RX ORDER — NEOMYCIN SULFATE 500 MG/1
500 TABLET ORAL
Qty: 6 | Refills: 0 | Status: DISCONTINUED | COMMUNITY
Start: 2021-06-28 | End: 2021-08-09

## 2021-08-09 RX ORDER — AMOXICILLIN AND CLAVULANATE POTASSIUM 875; 125 MG/1; MG/1
875-125 TABLET, COATED ORAL
Qty: 20 | Refills: 1 | Status: DISCONTINUED | COMMUNITY
Start: 2021-07-08 | End: 2021-08-09

## 2021-08-09 NOTE — PHYSICAL EXAM
[Abdomen Masses] : No abdominal masses [Abdomen Tenderness] : ~T No ~M abdominal tenderness [JVD] : no jugular venous distention  [Normal Breath Sounds] : Normal breath sounds [Normal Heart Sounds] : normal heart sounds [de-identified] : Abdomen is soft, nontender, nondistended. Incisions are well healed. No hernia or masses\par

## 2021-08-09 NOTE — ASSESSMENT
[FreeTextEntry1] : Recovering from sigmoid resection for complex root diverticulitis.\par \par Advised increasing diet with fiber and hydration.\par \par Continued avoiding straining and lifting for additional 3-4 weeks.\par \par She will return to her primary gastroenterologist for routine surveillance colonoscopy as scheduled.\par

## 2021-08-30 ENCOUNTER — NON-APPOINTMENT (OUTPATIENT)
Age: 51
End: 2021-08-30

## 2021-10-06 ENCOUNTER — NON-APPOINTMENT (OUTPATIENT)
Age: 51
End: 2021-10-06

## 2021-10-20 NOTE — PRE-OP CHECKLIST - NSSDAENDDT_GEN_ALL_CORE
What Type Of Note Output Would You Prefer (Optional)?: Bullet Format
How Severe Is Your Skin Lesion?: moderate
Has Your Skin Lesion Been Treated?: not been treated
Is This A New Presentation, Or A Follow-Up?: Skin Lesions
Which Family Member (Optional)?: Grandfather
Is This A New Presentation, Or A Follow-Up?: Skin Lesion
28-Jul-2021 08:00

## 2021-11-28 NOTE — HISTORY OF PRESENT ILLNESS
[FreeTextEntry1] : 52 y/o F presents for f/u diverticulitis\par \par S/p robotic sigmoid resection on 7/28/21\par Final Diagnosis\par 1. Sigmoid colon and rectum, resection:\par - Diverticulosis without acute diverticulitis\par \par 2. Colon, distal and proximal donuts, excision:\par - Unremarkable colonic mucosa and wall\par \par Doing well.\par \par Reports incisional discomfort at right lower quadrant incision.\par \par Good appetite.\par Energy improving.\par \par Bowel movements hard with constipation intermittently. None known

## 2022-08-22 ENCOUNTER — APPOINTMENT (OUTPATIENT)
Dept: COLORECTAL SURGERY | Facility: CLINIC | Age: 52
End: 2022-08-22

## 2024-01-10 ENCOUNTER — APPOINTMENT (OUTPATIENT)
Dept: GASTROENTEROLOGY | Facility: CLINIC | Age: 54
End: 2024-01-10
Payer: COMMERCIAL

## 2024-01-10 VITALS
WEIGHT: 150 LBS | BODY MASS INDEX: 26.58 KG/M2 | HEART RATE: 70 BPM | DIASTOLIC BLOOD PRESSURE: 70 MMHG | HEIGHT: 63 IN | SYSTOLIC BLOOD PRESSURE: 116 MMHG | OXYGEN SATURATION: 98 %

## 2024-01-10 DIAGNOSIS — K21.9 GASTRO-ESOPHAGEAL REFLUX DISEASE W/OUT ESOPHAGITIS: ICD-10-CM

## 2024-01-10 DIAGNOSIS — R10.11 RIGHT UPPER QUADRANT PAIN: ICD-10-CM

## 2024-01-10 DIAGNOSIS — K59.00 CONSTIPATION, UNSPECIFIED: ICD-10-CM

## 2024-01-10 PROCEDURE — 99204 OFFICE O/P NEW MOD 45 MIN: CPT

## 2024-01-10 NOTE — HISTORY OF PRESENT ILLNESS
[de-identified] : Dr. Ureña ( 07/2021)--Hiatal hernia, neg for H. pylori  [FreeTextEntry1] : Dr. Ureña ( 07/2021)-- moderate diverticulosis ( repeat colonoscopy in 5 years/2026)

## 2024-01-10 NOTE — ASSESSMENT
[FreeTextEntry1] : 1. Labs in the office today  2. Abdominal US to r/o biliary colic  3.  Reviewed Lifestyle and dietary modifications  -Encouraged Weight loss with aerobic exercises 30 min a day -Eliminating and or identifying dietary triggers (alcohol, caffeine, chocolate, spicy foods, food with high fat content, carbonated beverages, Dairy, peppermint, Licorice ect. )  -sitting upright after meals, avoiding meals 3-4 hrs before bed, Bed wedge when sleeping  -Keep a food diary to identify trigger foods.  4. Acid suppression medications as prescribed for symptomatic relief; purpose, dosing and SE discussed.  5. Consider EGD if symptoms persist  6. OV in 3-4 weeks if symptoms persist.   # constipation  1. Reviewed dietary and lifestyle modifications  2. Encouraged High fiber diet (goal of 20-30 grams fiber/day) 3. Adequate water (40+ ounces/day) 4. Encouraged Aerobic exercise 30 mins/day as tolerated. 5. Daily MiraLAX up to twice a day for the next 5 days than daily as needed.  6. Stimulant/bulk laxatives as needed: Metamucil Vs. Benefiter Vs. Citrucel; dosing discussed in length.  7. Reinforced importance of paying close attention to body's signals to have BM; Ignoring body's signals to have a bowel movement causes signals to become weaker over time. 8. Stress management/reduction  9. Keep a food diary log for the next 14 days.  10. Medical therapy to be considered if symptoms remain significant after lifestyle changes ( low dose antidepressants, anticholinergics, alternative medications such as aloe, peppermint oil ect.)  11. F/U in 4 weeks if symptoms persist

## 2024-01-10 NOTE — PHYSICAL EXAM
[Normal Voice/Communication] : normal voice/communication [Alert] : alert [Healthy Appearing] : healthy appearing [No Acute Distress] : no acute distress [Sclera] : the sclera and conjunctiva were normal [Hearing Threshold Finger Rub Not [place]] : hearing was normal [Normal Lips/Gums] : the lips and gums were normal [Oropharynx] : the oropharynx was normal [Normal Appearance] : the appearance of the neck was normal [No Neck Mass] : no neck mass was observed [No Respiratory Distress] : no respiratory distress [Respiration, Rhythm And Depth] : normal respiratory rhythm and effort [No Acc Muscle Use] : no accessory muscle use [Auscultation Breath Sounds / Voice Sounds] : lungs were clear to auscultation bilaterally [Heart Rate And Rhythm] : heart rate was normal and rhythm regular [Normal S1, S2] : normal S1 and S2 [Murmurs] : no murmurs [Bowel Sounds] : normal bowel sounds [No Masses] : no abdominal mass palpated [Abdomen Soft] : soft [] : no hepatosplenomegaly [RUQ] : RUQ [Epigastric] : epigastric [Oriented To Time, Place, And Person] : oriented to person, place, and time

## 2024-01-11 LAB
ALBUMIN SERPL ELPH-MCNC: 4.2 G/DL
ALP BLD-CCNC: 109 U/L
ALT SERPL-CCNC: 14 U/L
ANION GAP SERPL CALC-SCNC: 8 MMOL/L
AST SERPL-CCNC: 18 U/L
BASOPHILS # BLD AUTO: 0.04 K/UL
BASOPHILS NFR BLD AUTO: 0.4 %
BILIRUB SERPL-MCNC: 0.2 MG/DL
BUN SERPL-MCNC: 14 MG/DL
CALCIUM SERPL-MCNC: 9.5 MG/DL
CHLORIDE SERPL-SCNC: 108 MMOL/L
CO2 SERPL-SCNC: 26 MMOL/L
CREAT SERPL-MCNC: 0.69 MG/DL
CRP SERPL-MCNC: <3 MG/L
EGFR: 104 ML/MIN/1.73M2
EOSINOPHIL # BLD AUTO: 0.22 K/UL
EOSINOPHIL NFR BLD AUTO: 2.1 %
GLUCOSE SERPL-MCNC: 84 MG/DL
HCT VFR BLD CALC: 42.6 %
HGB BLD-MCNC: 13.9 G/DL
IMM GRANULOCYTES NFR BLD AUTO: 0.3 %
LPL SERPL-CCNC: 20 U/L
LYMPHOCYTES # BLD AUTO: 2.27 K/UL
LYMPHOCYTES NFR BLD AUTO: 21.9 %
MAN DIFF?: NORMAL
MCHC RBC-ENTMCNC: 30.5 PG
MCHC RBC-ENTMCNC: 32.6 GM/DL
MCV RBC AUTO: 93.6 FL
MONOCYTES # BLD AUTO: 0.53 K/UL
MONOCYTES NFR BLD AUTO: 5.1 %
NEUTROPHILS # BLD AUTO: 7.26 K/UL
NEUTROPHILS NFR BLD AUTO: 70.2 %
PLATELET # BLD AUTO: 236 K/UL
POTASSIUM SERPL-SCNC: 4.6 MMOL/L
PROT SERPL-MCNC: 6.5 G/DL
RBC # BLD: 4.55 M/UL
RBC # FLD: 12.8 %
SODIUM SERPL-SCNC: 142 MMOL/L
TSH SERPL-ACNC: 1.02 UIU/ML
WBC # FLD AUTO: 10.35 K/UL

## 2024-01-12 LAB
ENDOMYSIUM IGA SER QL: NEGATIVE
ENDOMYSIUM IGA TITR SER: NORMAL
GLIADIN IGA SER QL: <5 UNITS
GLIADIN IGG SER QL: <5 UNITS
GLIADIN PEPTIDE IGA SER-ACNC: NEGATIVE
GLIADIN PEPTIDE IGG SER-ACNC: NEGATIVE
IGA SER QL IEP: 202 MG/DL
TTG IGA SER IA-ACNC: <1.2 U/ML
TTG IGA SER-ACNC: NEGATIVE
TTG IGG SER IA-ACNC: 1.2 U/ML
TTG IGG SER IA-ACNC: NEGATIVE

## 2024-01-22 ENCOUNTER — RESULT REVIEW (OUTPATIENT)
Age: 54
End: 2024-01-22

## 2024-04-04 ENCOUNTER — RESULT REVIEW (OUTPATIENT)
Age: 54
End: 2024-04-04

## 2024-04-05 ENCOUNTER — APPOINTMENT (OUTPATIENT)
Dept: GASTROENTEROLOGY | Facility: HOSPITAL | Age: 54
End: 2024-04-05

## 2024-04-06 ENCOUNTER — TRANSCRIPTION ENCOUNTER (OUTPATIENT)
Age: 54
End: 2024-04-06

## 2024-04-15 ENCOUNTER — APPOINTMENT (OUTPATIENT)
Dept: GASTROENTEROLOGY | Facility: CLINIC | Age: 54
End: 2024-04-15
Payer: COMMERCIAL

## 2024-04-15 DIAGNOSIS — K29.80 DUODENITIS W/OUT BLEEDING: ICD-10-CM

## 2024-04-15 DIAGNOSIS — B96.81 DUODENITIS W/OUT BLEEDING: ICD-10-CM

## 2024-04-15 PROCEDURE — 99442: CPT

## 2024-04-15 RX ORDER — PANTOPRAZOLE 40 MG/1
40 TABLET, DELAYED RELEASE ORAL DAILY
Qty: 90 | Refills: 3 | Status: DISCONTINUED | COMMUNITY
Start: 2024-01-10 | End: 2024-04-15

## 2024-04-15 RX ORDER — BISMUTH SUBCITRATE POTASSIUM, METRONIDAZOLE, AND TETRACYCLINE HYDROCHLORIDE 140; 125; 125 MG/1; MG/1; MG/1
140-125-125 CAPSULE ORAL
Qty: 56 | Refills: 0 | Status: ACTIVE | COMMUNITY
Start: 2024-04-15 | End: 1900-01-01

## 2024-04-15 RX ORDER — OMEPRAZOLE 20 MG/1
20 CAPSULE, DELAYED RELEASE ORAL
Qty: 30 | Refills: 0 | Status: DISCONTINUED | COMMUNITY
Start: 2021-06-28 | End: 2024-04-15

## 2024-04-15 RX ORDER — PANTOPRAZOLE 40 MG/1
40 TABLET, DELAYED RELEASE ORAL
Qty: 90 | Refills: 3 | Status: DISCONTINUED | COMMUNITY
Start: 2024-04-05 | End: 2024-04-15

## 2024-04-15 RX ORDER — PANTOPRAZOLE 40 MG/1
40 TABLET, DELAYED RELEASE ORAL TWICE DAILY
Qty: 28 | Refills: 0 | Status: ACTIVE | COMMUNITY
Start: 2024-04-15 | End: 1900-01-01

## 2024-06-04 NOTE — PHYSICAL EXAM
Pt continues to request pain meds - encouraged coughing and deep breathing - warm blanket applied.  Waiting for anesthesia to call with orders   [Abdomen Masses] : No abdominal masses [Abdomen Tenderness] : ~T No ~M abdominal tenderness [No HSM] : no hepatosplenomegaly [JVD] : no jugular venous distention  [Normal Breath Sounds] : Normal breath sounds [Normal Heart Sounds] : normal heart sounds

## 2024-06-12 ENCOUNTER — APPOINTMENT (OUTPATIENT)
Dept: GASTROENTEROLOGY | Facility: CLINIC | Age: 54
End: 2024-06-12
Payer: COMMERCIAL

## 2024-06-12 VITALS
SYSTOLIC BLOOD PRESSURE: 110 MMHG | HEIGHT: 63 IN | WEIGHT: 150 LBS | DIASTOLIC BLOOD PRESSURE: 70 MMHG | BODY MASS INDEX: 26.58 KG/M2

## 2024-06-12 DIAGNOSIS — B96.81 GASTRITIS, UNSPECIFIED, W/OUT BLEEDING: ICD-10-CM

## 2024-06-12 DIAGNOSIS — K29.70 GASTRITIS, UNSPECIFIED, W/OUT BLEEDING: ICD-10-CM

## 2024-06-12 PROCEDURE — 99213 OFFICE O/P EST LOW 20 MIN: CPT

## 2024-06-12 NOTE — ASSESSMENT
[FreeTextEntry1] : 1.  We need to check the patient for H. pylori by a breath test and we will arrange this with our MA 2..  The patient cannot use the omeprazole or any acid suppressant medication for 14 days before the breath test. 3..  Since the patient has stopped the omeprazole and seems to be doing pretty well without it I would like to try reflux Gourmet as a treatment, because it is a liquid which is swallowed and coats the esophagus but also when it hits the stomach it turns into a foam and neck foam will settle at the junction of the stomach and the esophagus and block acid from regurgitated. 4.  I am giving her a link for the reflux Gourmet

## 2024-06-12 NOTE — HISTORY OF PRESENT ILLNESS
[FreeTextEntry1] : #1.post rx for H Pylori gastritis  patient is here for follow up she has been doing much better  She originally had presented with increasing symptoms of acid reflux with substernal discomfort and burning which radiated up into the substernal area, was worse perhaps when she would lie down too soon after eating, and was primarily related to ingesting foods that could be considered spicy or esophageal irritants. Since the EGD she has done well once we identified and treated the H. pylori.  She has no esophagitis or Sald[place]'s.  However there was H. pylori on the gastric biopsies.  We gave her a triple antibiotic therapy with amoxicillin twice a day clarithromycin twice a day and omeprazole twice a day and she has done very well Since being treated she is being maintained on omeprazole 20 mg/day, but just recently she decided to try herself off the omeprazole and she has not had omeprazole for the last 5 days.  In spite of that she has not been having any particular symptoms.

## 2024-07-01 ENCOUNTER — LABORATORY RESULT (OUTPATIENT)
Age: 54
End: 2024-07-01

## 2024-12-17 ENCOUNTER — APPOINTMENT (OUTPATIENT)
Dept: GASTROENTEROLOGY | Facility: CLINIC | Age: 54
End: 2024-12-17
Payer: COMMERCIAL

## 2024-12-17 VITALS
BODY MASS INDEX: 26.05 KG/M2 | WEIGHT: 147 LBS | SYSTOLIC BLOOD PRESSURE: 110 MMHG | OXYGEN SATURATION: 97 % | HEIGHT: 63 IN | DIASTOLIC BLOOD PRESSURE: 70 MMHG | HEART RATE: 70 BPM

## 2024-12-17 DIAGNOSIS — Z90.49 ACQUIRED ABSENCE OF OTHER SPECIFIED PARTS OF DIGESTIVE TRACT: ICD-10-CM

## 2024-12-17 DIAGNOSIS — K59.09 OTHER CONSTIPATION: ICD-10-CM

## 2024-12-17 DIAGNOSIS — H40.9 UNSPECIFIED GLAUCOMA: ICD-10-CM

## 2024-12-17 PROCEDURE — 99214 OFFICE O/P EST MOD 30 MIN: CPT

## 2024-12-17 RX ORDER — SODIUM PICOSULFATE, MAGNESIUM OXIDE, AND ANHYDROUS CITRIC ACID 12; 3.5; 1 G/175ML; G/175ML; MG/175ML
10-3.5-12 MG-GM LIQUID ORAL
Qty: 2 | Refills: 1 | Status: ACTIVE | COMMUNITY
Start: 2024-12-17 | End: 1900-01-01

## 2024-12-24 ENCOUNTER — NON-APPOINTMENT (OUTPATIENT)
Age: 54
End: 2024-12-24

## 2024-12-25 PROBLEM — F10.90 ALCOHOL USE: Status: ACTIVE | Noted: 2021-06-28

## 2025-02-19 ENCOUNTER — APPOINTMENT (OUTPATIENT)
Dept: GASTROENTEROLOGY | Facility: HOSPITAL | Age: 55
End: 2025-02-19

## 2025-02-19 ENCOUNTER — TRANSCRIPTION ENCOUNTER (OUTPATIENT)
Age: 55
End: 2025-02-19

## 2025-02-19 ENCOUNTER — RESULT REVIEW (OUTPATIENT)
Age: 55
End: 2025-02-19